# Patient Record
Sex: FEMALE | Race: WHITE
[De-identification: names, ages, dates, MRNs, and addresses within clinical notes are randomized per-mention and may not be internally consistent; named-entity substitution may affect disease eponyms.]

---
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
## 2019-08-06 ENCOUNTER — HOSPITAL ENCOUNTER (EMERGENCY)
Dept: HOSPITAL 10 - FTE | Age: 19
Discharge: HOME | End: 2019-08-06
Payer: SELF-PAY

## 2019-08-06 ENCOUNTER — HOSPITAL ENCOUNTER (EMERGENCY)
Dept: HOSPITAL 91 - FTE | Age: 19
Discharge: HOME | End: 2019-08-06
Payer: SELF-PAY

## 2019-08-06 VITALS
BODY MASS INDEX: 21.52 KG/M2 | WEIGHT: 113.98 LBS | BODY MASS INDEX: 21.52 KG/M2 | HEIGHT: 61 IN | HEIGHT: 61 IN | WEIGHT: 113.98 LBS

## 2019-08-06 VITALS — DIASTOLIC BLOOD PRESSURE: 83 MMHG | HEART RATE: 108 BPM | RESPIRATION RATE: 20 BRPM | SYSTOLIC BLOOD PRESSURE: 128 MMHG

## 2019-08-06 DIAGNOSIS — M54.6: Primary | ICD-10-CM

## 2019-08-06 PROCEDURE — 99282 EMERGENCY DEPT VISIT SF MDM: CPT

## 2019-08-06 RX ADMIN — IBUPROFEN 1 MG: 800 TABLET, FILM COATED ORAL at 22:53
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
